# Patient Record
Sex: MALE | Race: WHITE | NOT HISPANIC OR LATINO | Employment: FULL TIME | URBAN - METROPOLITAN AREA
[De-identification: names, ages, dates, MRNs, and addresses within clinical notes are randomized per-mention and may not be internally consistent; named-entity substitution may affect disease eponyms.]

---

## 2023-06-20 ENCOUNTER — OFFICE VISIT (OUTPATIENT)
Dept: URGENT CARE | Facility: CLINIC | Age: 58
End: 2023-06-20
Payer: OTHER GOVERNMENT

## 2023-06-20 VITALS
SYSTOLIC BLOOD PRESSURE: 125 MMHG | HEIGHT: 72 IN | TEMPERATURE: 98 F | RESPIRATION RATE: 16 BRPM | WEIGHT: 265 LBS | BODY MASS INDEX: 35.89 KG/M2 | DIASTOLIC BLOOD PRESSURE: 84 MMHG | HEART RATE: 93 BPM | OXYGEN SATURATION: 97 %

## 2023-06-20 DIAGNOSIS — B96.89 BACTERIAL SINUSITIS: Primary | ICD-10-CM

## 2023-06-20 DIAGNOSIS — J32.9 BACTERIAL SINUSITIS: Primary | ICD-10-CM

## 2023-06-20 PROCEDURE — 99203 OFFICE O/P NEW LOW 30 MIN: CPT | Mod: S$GLB,,, | Performed by: PHYSICIAN ASSISTANT

## 2023-06-20 PROCEDURE — 99203 PR OFFICE/OUTPT VISIT, NEW, LEVL III, 30-44 MIN: ICD-10-PCS | Mod: S$GLB,,, | Performed by: PHYSICIAN ASSISTANT

## 2023-06-20 RX ORDER — LOSARTAN POTASSIUM 100 MG/1
100 TABLET ORAL
COMMUNITY
Start: 2023-01-30

## 2023-06-20 RX ORDER — CARVEDILOL 25 MG/1
25 TABLET ORAL
COMMUNITY
Start: 2023-01-30

## 2023-06-20 RX ORDER — GLIPIZIDE 10 MG/1
10 TABLET ORAL
COMMUNITY
Start: 2023-01-30

## 2023-06-20 RX ORDER — FUROSEMIDE 40 MG/1
1 TABLET ORAL DAILY
COMMUNITY
Start: 2023-01-30

## 2023-06-20 RX ORDER — PREDNISONE 20 MG/1
40 TABLET ORAL DAILY
Qty: 6 TABLET | Refills: 0 | Status: SHIPPED | OUTPATIENT
Start: 2023-06-20 | End: 2023-06-23

## 2023-06-20 RX ORDER — ATORVASTATIN CALCIUM 80 MG/1
40 TABLET, FILM COATED ORAL
COMMUNITY
Start: 2023-01-30

## 2023-06-20 RX ORDER — AMOXICILLIN AND CLAVULANATE POTASSIUM 875; 125 MG/1; MG/1
1 TABLET, FILM COATED ORAL 2 TIMES DAILY
Qty: 14 TABLET | Refills: 0 | Status: SHIPPED | OUTPATIENT
Start: 2023-06-20 | End: 2023-06-27

## 2023-06-20 RX ORDER — POTASSIUM CHLORIDE 20 MEQ/1
20 TABLET, EXTENDED RELEASE ORAL
COMMUNITY
Start: 2023-01-30

## 2023-06-20 NOTE — PATIENT INSTRUCTIONS
Please take antibiotic to completion.  Take oral steroid as prescribed. You were prescribed a steroid today - this medication can elevate your blood pressure, elevate your blood sugar, cause water weight gain, nervous energy, and/or redness to the face. If you have a history of hypertension, closely monitor your blood pressure over the next few days. If you have a history of diabetes, closely monitor your blood sugar over the next few days. Please call our clinic for any questions or concerns at [phone].      Below are over-the-counter suggestions and recommendations for symptomatic relief:     -Make sure to stay well hydrated.   -NASAL SALINE reduces dryness and can mechanically move any post-nasal drip from your eustachian tube or from the back of your throat.   -WARM SALTWATER GARGLES to soothe throat pain (1/2 tsp salt to 1 cup warm water; gargle as needed)   -ANTIHISTAMINES (I.e. claritin, zyrtec, allegra) will help relieve itching/sneezing/runny nose, but wont relieve nasal congestion. Be aware benadryl may cause drowsiness.   -PSEUDOEPHEDRINE (behind the counter) can help with congestion (30 mg up to 240 mg/day). Be aware this medication can cause elevated blood pressure and palpitations.  -MUCINEX (guaifenesin) to break up mucous - you can take up to 2400 mg/day. Mucinex DM pill has a cough suppressant that can be sedating. You can use this at night to stop the tickle at the back of your throat. You can use Mucinex D (it has guaifenesin and a high dose of pseudoephedrine) in the mornings to help decongest.  -AFRIN in each nostril for nasal congestion. Use no longer than 3 days, as it is addictive. It can dry out your mucous membranes and cause elevated blood pressure. *Useful for when flying!*   -FLONASE 1-2 sprays/nostril per day. It is a local acting steroid nasal spray. If you develop a bloody nose, stop using the medication immediately.  -NYQUIL at night to help with rest. This contains an  antihistamine and tylenol.   -HONEY is a natural cough suppressant that can be used.  -TYLENOL up to 4,000 mg a day is safe for short periods and can be used for body aches, pain, and fever; however, in high doses and prolonged use it can cause liver irritation.  -IBUPROFEN is a non-steroidal anti-inflammatory that can be used for body aches, pain, and fever; however, it can also cause stomach irritation if over used.   -CHLORASEPTIC SPRAY also helps to numb throat pain.  -Warm face compresses to help with facial sinus pain/pressure.      If you DO NOT have Hypertension or any history of palpitations, it is ok to take over the counter Sudafed, Mucinex D, Allegra-D, Claritin-D, or Zyrtec-D.  It is ok to combine one of the above with PLAIN over the counter antihistamine. If, for example, you are taking Zyrtec -D, you can combine with PLAIN Mucinex, but not Mucinex-D.  If you are taking Mucinex-D, you can combine that with PLAIN Allegra or Claritin or Zyrtec.     If you DO have Hypertension or palpitations, it is safe to take CORICIDIN for relief of sinus symptoms.      Please follow up with your primary care provider within 2-5 days if your signs and symptoms have not resolved or worsen.     If your condition worsens or fails to improve we recommend that you receive another evaluation at the emergency room immediately or contact your primary medical clinic to discuss your concerns.   You must understand that you have received an Urgent Care treatment only and that you may be released before all of your medical problems are known or treated. You, the patient, will arrange for follow up care as instructed.

## 2023-06-20 NOTE — PROGRESS NOTES
Subjective:      Patient ID: Jagdeep Conti is a 57 y.o. male.    Vitals:  height is 6' (1.829 m) and weight is 120.2 kg (265 lb). His temperature is 98.4 °F (36.9 °C). His blood pressure is 125/84 and his pulse is 93. His respiration is 16 and oxygen saturation is 97%.     Chief Complaint: Nasal Congestion    Patient presents with complaints of congestion, sore throat, and cough for 1 week. He has tried over the counter medication with no relief. Pt states his symptoms have gradually worsened and now reports severe pressure and pain behind his forehead/eyes.     Other  This is a new problem. The current episode started in the past 7 days. The problem occurs constantly. The problem has been gradually worsening. Associated symptoms include congestion, coughing, headaches and a sore throat (secondary to cough per patient). Pertinent negatives include no abdominal pain, chest pain, chills, diaphoresis, fever, myalgias, nausea, neck pain, rash or vomiting. Exacerbated by: laying down. Treatments tried: over the counter cold meds. The treatment provided no relief.     Constitution: Negative for chills, sweating and fever.   HENT:  Positive for congestion, postnasal drip, sinus pain, sinus pressure and sore throat (secondary to cough per patient). Negative for ear pain (pressure).    Neck: Negative for neck pain, neck stiffness and painful lymph nodes.   Cardiovascular:  Negative for chest pain, palpitations and sob on exertion.   Eyes:  Negative for eye discharge, eye itching and eye pain.   Respiratory:  Positive for cough. Negative for sputum production and shortness of breath.    Gastrointestinal:  Negative for abdominal pain, nausea, vomiting and diarrhea.   Genitourinary:  Negative for dysuria, hematuria and pelvic pain.   Musculoskeletal:  Negative for pain, muscle cramps and muscle ache.   Skin:  Negative for pale, rash and wound.   Neurological:  Positive for headaches. Negative for dizziness and light-headedness.    Hematologic/Lymphatic: Negative for swollen lymph nodes.    Objective:     Physical Exam   Constitutional: He is oriented to person, place, and time. He appears well-developed. He is cooperative. He does not appear ill. No distress.   HENT:   Head: Normocephalic and atraumatic.   Ears:   Right Ear: External ear and ear canal normal. Tympanic membrane is erythematous and bulging.   Left Ear: Tympanic membrane, external ear and ear canal normal.   Nose: Mucosal edema present. No rhinorrhea. Right sinus exhibits frontal sinus tenderness. Right sinus exhibits no maxillary sinus tenderness. Left sinus exhibits frontal sinus tenderness. Left sinus exhibits no maxillary sinus tenderness.   Mouth/Throat: Uvula is midline and mucous membranes are normal. Posterior oropharyngeal erythema and cobblestoning present. No oropharyngeal exudate or posterior oropharyngeal edema.   Eyes: Conjunctivae, EOM and lids are normal.   Neck: Trachea normal and phonation normal. Neck supple.   Cardiovascular: Regular rhythm and normal heart sounds.   No murmur heard.Exam reveals no gallop.   Pulmonary/Chest: Effort normal and breath sounds normal. No respiratory distress. He has no decreased breath sounds. He has no wheezes. He has no rhonchi. He has no rales.   Musculoskeletal: Normal range of motion.         General: Normal range of motion.   Neurological: He is alert and oriented to person, place, and time.   Skin: Skin is warm, dry, intact and not diaphoretic.   Psychiatric: His speech is normal and behavior is normal. Judgment and thought content normal.   Nursing note and vitals reviewed.    Assessment:     1. Bacterial sinusitis        Plan:       Bacterial sinusitis  -     predniSONE (DELTASONE) 20 MG tablet; Take 2 tablets (40 mg total) by mouth once daily. for 3 days  Dispense: 6 tablet; Refill: 0  -     amoxicillin-clavulanate 875-125mg (AUGMENTIN) 875-125 mg per tablet; Take 1 tablet by mouth 2 (two) times daily. for 7 days   Dispense: 14 tablet; Refill: 0          Medical Decision Making:   Urgent Care Management:  Treating patient for bacterial frontal sinusitis with augmentin. Pt reports severe sinus pressure, post nasal drip, cough, and sore throat. Will treat inflammation with oral steroids. PMH includes DM type 2. Advised patient monitor glucose closely while taking oral steroid, as his sugars may be more elevated than usual. Also advised patient try coricidin over the counter, as this will not elevate his blood pressure. Pt agrees with treatment plan. Return/ER precautions discussed.       Patient Instructions   Please take antibiotic to completion.  Take oral steroid as prescribed. You were prescribed a steroid today - this medication can elevate your blood pressure, elevate your blood sugar, cause water weight gain, nervous energy, and/or redness to the face. If you have a history of hypertension, closely monitor your blood pressure over the next few days. If you have a history of diabetes, closely monitor your blood sugar over the next few days. Please call our clinic for any questions or concerns at [phone].      Below are over-the-counter suggestions and recommendations for symptomatic relief:     -Make sure to stay well hydrated.   -NASAL SALINE reduces dryness and can mechanically move any post-nasal drip from your eustachian tube or from the back of your throat.   -WARM SALTWATER GARGLES to soothe throat pain (1/2 tsp salt to 1 cup warm water; gargle as needed)   -ANTIHISTAMINES (I.e. claritin, zyrtec, allegra) will help relieve itching/sneezing/runny nose, but wont relieve nasal congestion. Be aware benadryl may cause drowsiness.   -PSEUDOEPHEDRINE (behind the counter) can help with congestion (30 mg up to 240 mg/day). Be aware this medication can cause elevated blood pressure and palpitations.  -MUCINEX (guaifenesin) to break up mucous - you can take up to 2400 mg/day. Mucinex DM pill has a cough suppressant that  can be sedating. You can use this at night to stop the tickle at the back of your throat. You can use Mucinex D (it has guaifenesin and a high dose of pseudoephedrine) in the mornings to help decongest.  -AFRIN in each nostril for nasal congestion. Use no longer than 3 days, as it is addictive. It can dry out your mucous membranes and cause elevated blood pressure. *Useful for when flying!*   -FLONASE 1-2 sprays/nostril per day. It is a local acting steroid nasal spray. If you develop a bloody nose, stop using the medication immediately.  -NYQUIL at night to help with rest. This contains an antihistamine and tylenol.   -HONEY is a natural cough suppressant that can be used.  -TYLENOL up to 4,000 mg a day is safe for short periods and can be used for body aches, pain, and fever; however, in high doses and prolonged use it can cause liver irritation.  -IBUPROFEN is a non-steroidal anti-inflammatory that can be used for body aches, pain, and fever; however, it can also cause stomach irritation if over used.   -CHLORASEPTIC SPRAY also helps to numb throat pain.  -Warm face compresses to help with facial sinus pain/pressure.      If you DO NOT have Hypertension or any history of palpitations, it is ok to take over the counter Sudafed, Mucinex D, Allegra-D, Claritin-D, or Zyrtec-D.  It is ok to combine one of the above with PLAIN over the counter antihistamine. If, for example, you are taking Zyrtec -D, you can combine with PLAIN Mucinex, but not Mucinex-D.  If you are taking Mucinex-D, you can combine that with PLAIN Allegra or Claritin or Zyrtec.     If you DO have Hypertension or palpitations, it is safe to take CORICIDIN for relief of sinus symptoms.      Please follow up with your primary care provider within 2-5 days if your signs and symptoms have not resolved or worsen.     If your condition worsens or fails to improve we recommend that you receive another evaluation at the emergency room immediately or contact  your primary medical clinic to discuss your concerns.   You must understand that you have received an Urgent Care treatment only and that you may be released before all of your medical problems are known or treated. You, the patient, will arrange for follow up care as instructed.

## 2023-06-20 NOTE — LETTER
June 20, 2023      [facility]  [address] [place] [address]  [place] [address]  Phone: [phone]  Fax: [phone]       Patient: Jagdeep Conti   YOB: 1965  Date of Visit: 06/20/2023    To Whom It May Concern:    Jaun Conti  was at [facility] on 06/20/2023. The patient may return to work/school on 06/22/2023 with no restrictions. If you have any questions or concerns, or if I can be of further assistance, please do not hesitate to contact me.    Sincerely,    Jen Quigley PA-C

## 2023-06-26 ENCOUNTER — TELEPHONE (OUTPATIENT)
Dept: URGENT CARE | Facility: CLINIC | Age: 58
End: 2023-06-26
Payer: OTHER GOVERNMENT